# Patient Record
Sex: MALE | Race: WHITE | NOT HISPANIC OR LATINO
[De-identification: names, ages, dates, MRNs, and addresses within clinical notes are randomized per-mention and may not be internally consistent; named-entity substitution may affect disease eponyms.]

---

## 2022-05-20 ENCOUNTER — APPOINTMENT (OUTPATIENT)
Dept: ORTHOPEDIC SURGERY | Facility: CLINIC | Age: 77
End: 2022-05-20

## 2022-05-25 PROBLEM — Z00.00 ENCOUNTER FOR PREVENTIVE HEALTH EXAMINATION: Status: ACTIVE | Noted: 2022-05-25

## 2022-06-10 ENCOUNTER — APPOINTMENT (OUTPATIENT)
Dept: ORTHOPEDIC SURGERY | Facility: CLINIC | Age: 77
End: 2022-06-10
Payer: MEDICARE

## 2022-06-10 VITALS — WEIGHT: 213 LBS | HEIGHT: 73 IN | BODY MASS INDEX: 28.23 KG/M2

## 2022-06-10 PROCEDURE — J3490M: CUSTOM

## 2022-06-10 PROCEDURE — 73562 X-RAY EXAM OF KNEE 3: CPT | Mod: LT

## 2022-06-10 PROCEDURE — 20610 DRAIN/INJ JOINT/BURSA W/O US: CPT

## 2022-06-10 PROCEDURE — 99214 OFFICE O/P EST MOD 30 MIN: CPT | Mod: 25

## 2022-06-10 NOTE — PROCEDURE
[Large Joint Injection] : Large joint injection [Left] : of the left [Knee] : knee [Pain] : pain [Inflammation] : inflammation [Alcohol] : alcohol [Betadine] : betadine [Ethyl Chloride sprayed topically] : ethyl chloride sprayed topically [Sterile technique used] : sterile technique used [___ cc    6mg] :  Betamethasone (Celestone) ~Vcc of 6mg [___ cc    1%] : Lidocaine ~Vcc of 1%  [Effusion] : effusion [] : Patient tolerated procedure well [Call if redness, pain or fever occur] : call if redness, pain or fever occur [Apply ice for 15min out of every hour for the next 12-24 hours as tolerated] : apply ice for 15 minutes out of every hour for the next 12-24 hours as tolerated [Patient was advised to rest the joint(s) for ____ days] : patient was advised to rest the joint(s) for [unfilled] days [Previous OTC use and PT nontherapeutic] : patient has tried OTC's including aspirin, Ibuprofen, Aleve, etc or prescription NSAIDS, and/or exercises at home and/or physical therapy without satisfactory response [Patient had decreased mobility in the joint] : patient had decreased mobility in the joint [Risks, benefits, alternatives discussed / Verbal consent obtained] : the risks benefits, and alternatives have been discussed, and verbal consent was obtained [de-identified] : 20cc [de-identified] : clear yellow

## 2022-06-10 NOTE — PHYSICAL EXAM
[Left] : left knee [5___] : hamstring 5[unfilled]/5 [Equivocal] : equivocal Dahlia [] : negative Lachmann [TWNoteComboBox7] : flexion 125 degrees [de-identified] : extension 5 degrees

## 2022-06-10 NOTE — IMAGING
[Left] : left knee [All Views] : anteroposterior, lateral, skyline, and anteroposterior standing [advanced tricompartmental OA with medial compartment narrowing and varus alignment] : advanced tricompartmental OA with medial compartment narrowing and varus alignment

## 2022-06-10 NOTE — HISTORY OF PRESENT ILLNESS
[Dull/Aching] : dull/aching [Intermittent] : intermittent [de-identified] : 6-10-22- He is known to have b/l knee oa does well with episodic asp and csi last done in 2020. Over the last few months pain and swelling has returned [] : no [FreeTextEntry1] : Left Knee [FreeTextEntry5] : Pt stated that he having pain in the left knee and it here for a cortisone shot. [de-identified] : 11/2020

## 2022-08-01 ENCOUNTER — APPOINTMENT (OUTPATIENT)
Dept: ORTHOPEDIC SURGERY | Facility: CLINIC | Age: 77
End: 2022-08-01

## 2022-08-01 VITALS — WEIGHT: 213 LBS | BODY MASS INDEX: 28.23 KG/M2 | HEIGHT: 73 IN

## 2022-08-01 PROCEDURE — 20610 DRAIN/INJ JOINT/BURSA W/O US: CPT | Mod: 50

## 2022-08-01 PROCEDURE — 99213 OFFICE O/P EST LOW 20 MIN: CPT | Mod: 25

## 2022-08-01 NOTE — PROCEDURE
[Large Joint Injection] : Large joint injection [Left] : of the left [Knee] : knee [Pain] : pain [Inflammation] : inflammation [Alcohol] : alcohol [Betadine] : betadine [Ethyl Chloride sprayed topically] : ethyl chloride sprayed topically [Sterile technique used] : sterile technique used [Orthovisc] : Orthovisc [#1] : series #1 [Effusion] : effusion [] : Patient tolerated procedure well [Call if redness, pain or fever occur] : call if redness, pain or fever occur [Apply ice for 15min out of every hour for the next 12-24 hours as tolerated] : apply ice for 15 minutes out of every hour for the next 12-24 hours as tolerated [Patient was advised to rest the joint(s) for ____ days] : patient was advised to rest the joint(s) for [unfilled] days [Previous OTC use and PT nontherapeutic] : patient has tried OTC's including aspirin, Ibuprofen, Aleve, etc or prescription NSAIDS, and/or exercises at home and/or physical therapy without satisfactory response [Patient had decreased mobility in the joint] : patient had decreased mobility in the joint [Risks, benefits, alternatives discussed / Verbal consent obtained] : the risks benefits, and alternatives have been discussed, and verbal consent was obtained [de-identified] : 20cc [de-identified] : clear yellow

## 2022-08-01 NOTE — HISTORY OF PRESENT ILLNESS
[Dull/Aching] : dull/aching [Intermittent] : intermittent [de-identified] : 6-10-22- He is known to have b/l knee oa does well with episodic asp and csi last done in 2020. Over the last few months pain and swelling has returned [] : no [FreeTextEntry1] : Left Knee [FreeTextEntry5] : Pt stated that he having pain in the left knee and it here for a cortisone shot. [de-identified] : 11/2020

## 2022-08-01 NOTE — PHYSICAL EXAM
[Left] : left knee [5___] : hamstring 5[unfilled]/5 [Equivocal] : equivocal Dahlia [] : negative Lachmann [TWNoteComboBox7] : flexion 125 degrees [de-identified] : extension 5 degrees

## 2022-08-09 ENCOUNTER — APPOINTMENT (OUTPATIENT)
Dept: ORTHOPEDIC SURGERY | Facility: CLINIC | Age: 77
End: 2022-08-09

## 2022-08-09 PROCEDURE — 20610 DRAIN/INJ JOINT/BURSA W/O US: CPT | Mod: LT

## 2022-08-09 NOTE — PROCEDURE
[Large Joint Injection] : Large joint injection [Left] : of the left [Knee] : knee [Pain] : pain [Inflammation] : inflammation [Alcohol] : alcohol [Betadine] : betadine [Ethyl Chloride sprayed topically] : ethyl chloride sprayed topically [Sterile technique used] : sterile technique used [Orthovisc] : Orthovisc [#2] : series #2 [Effusion] : effusion [] : Patient tolerated procedure well [Call if redness, pain or fever occur] : call if redness, pain or fever occur [Apply ice for 15min out of every hour for the next 12-24 hours as tolerated] : apply ice for 15 minutes out of every hour for the next 12-24 hours as tolerated [Patient was advised to rest the joint(s) for ____ days] : patient was advised to rest the joint(s) for [unfilled] days [Previous OTC use and PT nontherapeutic] : patient has tried OTC's including aspirin, Ibuprofen, Aleve, etc or prescription NSAIDS, and/or exercises at home and/or physical therapy without satisfactory response [Patient had decreased mobility in the joint] : patient had decreased mobility in the joint [Risks, benefits, alternatives discussed / Verbal consent obtained] : the risks benefits, and alternatives have been discussed, and verbal consent was obtained

## 2022-08-09 NOTE — PHYSICAL EXAM
[Left] : left knee [5___] : hamstring 5[unfilled]/5 [Equivocal] : equivocal Dahlia [] : negative Lachmann [TWNoteComboBox7] : flexion 125 degrees [de-identified] : extension 5 degrees

## 2022-08-09 NOTE — HISTORY OF PRESENT ILLNESS
[Dull/Aching] : dull/aching [Intermittent] : intermittent [de-identified] : 8-9-22- for continued orthovisc #2 to the left knee\par \par 6-10-22- He is known to have b/l knee oa does well with episodic asp and csi last done in 2020. Over the last few months pain and swelling has returned [] : no [FreeTextEntry1] : Left Knee [FreeTextEntry5] : Pt stated that he having pain in the left knee and it here for a cortisone shot. [de-identified] : 11/2020

## 2022-08-15 ENCOUNTER — APPOINTMENT (OUTPATIENT)
Dept: ORTHOPEDIC SURGERY | Facility: CLINIC | Age: 77
End: 2022-08-15

## 2022-08-15 PROCEDURE — 20610 DRAIN/INJ JOINT/BURSA W/O US: CPT | Mod: LT

## 2022-08-15 NOTE — HISTORY OF PRESENT ILLNESS
[Dull/Aching] : dull/aching [Intermittent] : intermittent [de-identified] : 8-15-22- for continued orthovisc #3 to the left knee\par \par 6-10-22- He is known to have b/l knee oa does well with episodic asp and csi last done in 2020. Over the last few months pain and swelling has returned [] : no [FreeTextEntry1] : Left Knee [FreeTextEntry5] : Pt stated that he having pain in the left knee and it here for a cortisone shot. [de-identified] : 11/2020

## 2022-08-15 NOTE — PHYSICAL EXAM
[Left] : left knee [5___] : hamstring 5[unfilled]/5 [Equivocal] : equivocal Dahlia [] : negative Lachmann [TWNoteComboBox7] : flexion 125 degrees [de-identified] : extension 5 degrees

## 2022-08-22 ENCOUNTER — APPOINTMENT (OUTPATIENT)
Dept: ORTHOPEDIC SURGERY | Facility: CLINIC | Age: 77
End: 2022-08-22

## 2022-08-22 PROCEDURE — 20610 DRAIN/INJ JOINT/BURSA W/O US: CPT | Mod: LT

## 2022-08-22 NOTE — HISTORY OF PRESENT ILLNESS
[Dull/Aching] : dull/aching [Intermittent] : intermittent [de-identified] : 8-22-22- for continued orthovisc #4 to the left knee\par \par 6-10-22- He is known to have b/l knee oa does well with episodic asp and csi last done in 2020. Over the last few months pain and swelling has returned [] : no [FreeTextEntry1] : Left Knee [FreeTextEntry5] : Pt stated that he having pain in the left knee and it here for a cortisone shot. [de-identified] : 11/2020

## 2022-08-22 NOTE — PHYSICAL EXAM
[Left] : left knee [5___] : hamstring 5[unfilled]/5 [Equivocal] : equivocal Dahlia [] : negative Lachmann [TWNoteComboBox7] : flexion 125 degrees [de-identified] : extension 5 degrees

## 2022-09-12 ENCOUNTER — APPOINTMENT (OUTPATIENT)
Dept: ORTHOPEDIC SURGERY | Facility: CLINIC | Age: 77
End: 2022-09-12

## 2022-09-12 VITALS — WEIGHT: 213 LBS | BODY MASS INDEX: 28.23 KG/M2 | HEIGHT: 73 IN

## 2022-09-12 PROCEDURE — 99214 OFFICE O/P EST MOD 30 MIN: CPT

## 2022-09-12 NOTE — DISCUSSION/SUMMARY
[de-identified] : The natural progression of Osteoarthritis was explained to the patient.  We discussed the possible treatment options from conservative to operative.  These included NSAIDS, Glucosamine and Chondrotin sulfate, and Physical Therapy as well different types of injections.  We also discussed that at some point they may progress to needed a TKA.  Information and pamphlets were given when appropriate.\par \par Entered by Jaye Reilly acting as scribe.\par

## 2022-09-12 NOTE — IMAGING
[de-identified] : left knee:\par all medial joint line tenderness\par good pulses\par 7-115/120\par crepitus\par 5/5\par NVI\par \par left hip:\par no pain with ROM\par \par XR- advanced tricompartmental OA with significant progression from 2018

## 2022-09-12 NOTE — ASSESSMENT
Problem: Asthma: Day 1 Goal: *Oxygen saturation returns to baseline Outcome: Progressing Towards Goal 
98% on Room Air, expiratory wheezing [FreeTextEntry1] : 9/12/22: Advanced L knee OA - He has failed forms of conservative treatment and is indicated for TKA. Risks and benefits discussed, all questions answered and long discussion with his wife present. They do live in [place] during the winter and will plan for TKA around the Spring time. They will call and set up televisit in the new year to discuss possible dates in mid-late April.

## 2022-09-12 NOTE — HISTORY OF PRESENT ILLNESS
[Gradual] : gradual [5] : 5 [6] : 6 [Dull/Aching] : dull/aching [Localized] : localized [Sharp] : sharp [Constant] : constant [Injection therapy] : injection therapy [Nothing helps with pain getting better] : Nothing helps with pain getting better [de-identified] : 76 y/o M with hx of advanced knee OA that has been treated by Dr. Reddy. Patient has had knee pain for multiple years that has gotten progressively worse. Has had multiple series of HA injections, with the last series providing little benefit. Has not done PT, he has been doing HEP. OTC medication as needed.  [] : no [FreeTextEntry1] : left knee [FreeTextEntry5] : pt states he has bone on bone in his left knee [de-identified] : movement  [de-identified] : 8/22/22 [de-identified] : Mona [de-identified] : x ray

## 2022-10-06 ENCOUNTER — APPOINTMENT (OUTPATIENT)
Dept: ORTHOPEDIC SURGERY | Facility: CLINIC | Age: 77
End: 2022-10-06

## 2022-10-06 PROCEDURE — 99213 OFFICE O/P EST LOW 20 MIN: CPT | Mod: 25

## 2022-10-06 PROCEDURE — 20610 DRAIN/INJ JOINT/BURSA W/O US: CPT

## 2022-10-06 PROCEDURE — J3490M: CUSTOM

## 2022-10-06 NOTE — PHYSICAL EXAM
[Left] : left knee [5___] : hamstring 5[unfilled]/5 [Equivocal] : equivocal Dahlia [] : negative Lachmann [TWNoteComboBox7] : flexion 125 degrees [de-identified] : extension 5 degrees Yes

## 2022-10-06 NOTE — PROCEDURE
[Large Joint Injection] : Large joint injection [Left] : of the left [Knee] : knee [Pain] : pain [Inflammation] : inflammation [Alcohol] : alcohol [Betadine] : betadine [Ethyl Chloride sprayed topically] : ethyl chloride sprayed topically [Sterile technique used] : sterile technique used [] : Patient tolerated procedure well [Call if redness, pain or fever occur] : call if redness, pain or fever occur [Apply ice for 15min out of every hour for the next 12-24 hours as tolerated] : apply ice for 15 minutes out of every hour for the next 12-24 hours as tolerated [Patient was advised to rest the joint(s) for ____ days] : patient was advised to rest the joint(s) for [unfilled] days [Previous OTC use and PT nontherapeutic] : patient has tried OTC's including aspirin, Ibuprofen, Aleve, etc or prescription NSAIDS, and/or exercises at home and/or physical therapy without satisfactory response [Patient had decreased mobility in the joint] : patient had decreased mobility in the joint [Risks, benefits, alternatives discussed / Verbal consent obtained] : the risks benefits, and alternatives have been discussed, and verbal consent was obtained [___ cc    6mg] :  Betamethasone (Celestone) ~Vcc of 6mg [___ cc    1%] : Lidocaine ~Vcc of 1%  [Effusion] : effusion [de-identified] : 20cc [de-identified] : clear yellow

## 2022-10-06 NOTE — HISTORY OF PRESENT ILLNESS
[Dull/Aching] : dull/aching [Intermittent] : intermittent [de-identified] : 10-6-22- completed ha injection left knee in august. saw Dr Garrett in sept and is anticipating tka in the spring.\par \par \par 6-10-22- He is known to have b/l knee oa does well with episodic asp and csi last done in 2020. Over the last few months pain and swelling has returned [] : no [FreeTextEntry1] : Left Knee [FreeTextEntry5] : Pt stated that he having pain in the left knee and it here for a cortisone shot. [de-identified] : 11/2020

## 2022-11-29 ENCOUNTER — APPOINTMENT (OUTPATIENT)
Dept: ORTHOPEDIC SURGERY | Facility: CLINIC | Age: 77
End: 2022-11-29

## 2022-11-29 ENCOUNTER — FORM ENCOUNTER (OUTPATIENT)
Age: 77
End: 2022-11-29

## 2022-11-29 VITALS — WEIGHT: 216 LBS | BODY MASS INDEX: 28.63 KG/M2 | HEIGHT: 73 IN

## 2022-11-29 DIAGNOSIS — E78.00 PURE HYPERCHOLESTEROLEMIA, UNSPECIFIED: ICD-10-CM

## 2022-11-29 PROCEDURE — 99213 OFFICE O/P EST LOW 20 MIN: CPT

## 2022-11-29 NOTE — IMAGING
[de-identified] : left knee:\par all medial joint line tenderness\par good pulses\par 7-115/120\par crepitus\par 5/5\par NVI\par \par left hip:\par no pain with ROM\par \par XR- advanced tricompartmental OA with significant progression from 2018

## 2022-11-29 NOTE — HISTORY OF PRESENT ILLNESS
[1] : 2 [0] : 0 [Dull/Aching] : dull/aching [de-identified] : 11/29/22: Had inj last month min pain today but scheduled for TKA 5/3/23.\par \par Previous doc:\par 76 y/o M with hx of advanced knee OA that has been treated by Dr. Reddy. Patient has had knee pain for multiple years that has gotten progressively worse. Has had multiple series of HA injections, with the last series providing little benefit. Has not done PT, he has been doing HEP. OTC medication as needed.  [] : no [FreeTextEntry1] : Left knee [FreeTextEntry5] : TAMARA LUU is a 77 year old M here for a follow up for left knee pain. Pt states his pain is much better now since getting a cortisone shot.

## 2022-11-29 NOTE — DISCUSSION/SUMMARY
[de-identified] : The natural progression of Osteoarthritis was explained to the patient.  We discussed the possible treatment options from conservative to operative.  These included NSAIDS, Glucosamine and Chondrotin sulfate, and Physical Therapy as well different types of injections.  We also discussed that at some point they may progress to needed a TKA.  Information and pamphlets were given when appropriate.\par \par Patient Complains of pain in Knee with a level that often reaches greater than a 8/10. The Pain has been progressively worsening of his/her treatment coarse. The pain has interfered with their ADLs and worsens with weight bearing. On exam they often have episodes of swelling/effusion with limited ROM. Pain worsens with ROM passive and active and I can palpate crepitus.\par  X rays were reviewed with the patient and they show joint space narrowing, subchondral sclerosis, osteophyte formation, and subchondral cysts.\par  After a period of more than 12 weeks physical therapy or exercise program done with me or another treating physician they have continued pain. The patient has failed a trial of NSAID medication or pain relieves if they were unable to tolerate NSAID medications as well as a series of injection, steroid or Hyaluronic Acid. After a long discussion with the patient both the patient and I have decided we have exhausted all forms of less radical treatments and they would like to proceed with Total Knee Replacement\par \par We discussed my findings and the natural history of their condition.  We talked about the details of the proposed surgery and the recovery.  We discussed the material risks, possible benefits and alternatives to surgery.  The risks include but are not limited to infection, bleeding and possible need for blood transfusion, fracture, bowel blockage, bladder retention or infection, need for reoperation, stiffness and/or limited range of motion, possible damage to nerves and blood vessels, failure of fixation of components, risk of deep vein thromboses and pulmonary embolism, wound healing problems, dislocation, and possible leg length discrepancy.  Although incredibly rare, we also discussed the risks of a cardiac event, stroke and even death during, or following, the surgery.  We discussed the type of implants the patient will be receiving and the type of fixation that will be used, as well as whether a robot or computer navigation aide will be used.  The patient understands they will need medical clearance and will attend a preoperative joint education class.  We also discussed the type of anesthesia they will receive, and the risks associated with hospital or rehab length of stay, obesity, diabetes and smoking.\par

## 2022-11-29 NOTE — ASSESSMENT
[FreeTextEntry1] : Previous doc:\par 9/12/22: Advanced L knee OA - He has failed forms of conservative treatment and is indicated for TKA. Risks and benefits discussed, all questions answered and long discussion with his wife present. They do live in [place] during the winter and will plan for TKA around the Spring time. They will call and set up televisit in the new year to discuss possible dates in mid-late April.  \par \par 11/29/22: Planning for TKA in May - discussed risks and benefits.  Need CT left knee eval bone loss and he will do this before he leaves for [place] in Feb.

## 2022-11-30 ENCOUNTER — APPOINTMENT (OUTPATIENT)
Dept: CT IMAGING | Facility: CLINIC | Age: 77
End: 2022-11-30

## 2022-11-30 PROCEDURE — 76376 3D RENDER W/INTRP POSTPROCES: CPT | Mod: LT

## 2022-11-30 PROCEDURE — 73700 CT LOWER EXTREMITY W/O DYE: CPT | Mod: LT,MH

## 2023-01-20 ENCOUNTER — APPOINTMENT (OUTPATIENT)
Dept: ORTHOPEDIC SURGERY | Facility: CLINIC | Age: 78
End: 2023-01-20
Payer: MEDICARE

## 2023-01-20 PROCEDURE — J3490N: CUSTOM | Mod: NC

## 2023-01-20 PROCEDURE — 99213 OFFICE O/P EST LOW 20 MIN: CPT | Mod: 25

## 2023-01-20 PROCEDURE — 20610 DRAIN/INJ JOINT/BURSA W/O US: CPT | Mod: LT

## 2023-01-20 NOTE — HISTORY OF PRESENT ILLNESS
[Dull/Aching] : dull/aching [Intermittent] : intermittent [de-identified] : 1-20-23- Known b/l knee oa. last had csi in october. scheduled for left total knee with Tami in may.\par \par 10-6-22- completed ha injection left knee in august. saw Dr Garrett in sept and is anticipating tka in the spring.\par \par \par 6-10-22- He is known to have b/l knee oa does well with episodic asp and csi last done in 2020. Over the last few months pain and swelling has returned [] : no [FreeTextEntry1] : Left Knee [FreeTextEntry5] : Pt stated that he having pain in the left knee and it here for a cortisone shot. [de-identified] : 11/2020

## 2023-01-20 NOTE — PROCEDURE
[Large Joint Injection] : Large joint injection [Left] : of the left [Knee] : knee [Pain] : pain [Inflammation] : inflammation [Alcohol] : alcohol [Betadine] : betadine [Ethyl Chloride sprayed topically] : ethyl chloride sprayed topically [Sterile technique used] : sterile technique used [___ cc    6mg] :  Betamethasone (Celestone) ~Vcc of 6mg [___ cc    1%] : Lidocaine ~Vcc of 1%  [] : Patient tolerated procedure well [Call if redness, pain or fever occur] : call if redness, pain or fever occur [Apply ice for 15min out of every hour for the next 12-24 hours as tolerated] : apply ice for 15 minutes out of every hour for the next 12-24 hours as tolerated [Patient was advised to rest the joint(s) for ____ days] : patient was advised to rest the joint(s) for [unfilled] days [Previous OTC use and PT nontherapeutic] : patient has tried OTC's including aspirin, Ibuprofen, Aleve, etc or prescription NSAIDS, and/or exercises at home and/or physical therapy without satisfactory response [Patient had decreased mobility in the joint] : patient had decreased mobility in the joint [Risks, benefits, alternatives discussed / Verbal consent obtained] : the risks benefits, and alternatives have been discussed, and verbal consent was obtained

## 2023-01-20 NOTE — PHYSICAL EXAM
[Left] : left knee [5___] : hamstring 5[unfilled]/5 [Equivocal] : equivocal Dahlia [] : negative Lachmann [TWNoteComboBox7] : flexion 125 degrees [de-identified] : extension 5 degrees

## 2023-04-13 ENCOUNTER — OUTPATIENT (OUTPATIENT)
Dept: OUTPATIENT SERVICES | Facility: HOSPITAL | Age: 78
LOS: 1 days | Discharge: HOME | End: 2023-04-13
Payer: MEDICARE

## 2023-04-13 VITALS
SYSTOLIC BLOOD PRESSURE: 152 MMHG | WEIGHT: 222.67 LBS | RESPIRATION RATE: 18 BRPM | OXYGEN SATURATION: 95 % | DIASTOLIC BLOOD PRESSURE: 71 MMHG | HEIGHT: 73 IN | TEMPERATURE: 98 F | HEART RATE: 56 BPM

## 2023-04-13 DIAGNOSIS — E78.5 HYPERLIPIDEMIA, UNSPECIFIED: ICD-10-CM

## 2023-04-13 DIAGNOSIS — M17.12 UNILATERAL PRIMARY OSTEOARTHRITIS, LEFT KNEE: ICD-10-CM

## 2023-04-13 DIAGNOSIS — S76.111A STRAIN OF RIGHT QUADRICEPS MUSCLE, FASCIA AND TENDON, INITIAL ENCOUNTER: Chronic | ICD-10-CM

## 2023-04-13 DIAGNOSIS — Z01.818 ENCOUNTER FOR OTHER PREPROCEDURAL EXAMINATION: ICD-10-CM

## 2023-04-13 LAB
ALBUMIN SERPL ELPH-MCNC: 3.8 G/DL — SIGNIFICANT CHANGE UP (ref 3.3–5)
ALP SERPL-CCNC: 53 U/L — SIGNIFICANT CHANGE UP (ref 40–120)
ALT FLD-CCNC: 25 U/L — SIGNIFICANT CHANGE UP (ref 12–78)
ANION GAP SERPL CALC-SCNC: 3 MMOL/L — LOW (ref 5–17)
APTT BLD: 29.4 SEC — SIGNIFICANT CHANGE UP (ref 27.5–35.5)
AST SERPL-CCNC: 21 U/L — SIGNIFICANT CHANGE UP (ref 15–37)
BASOPHILS # BLD AUTO: 0.02 K/UL — SIGNIFICANT CHANGE UP (ref 0–0.2)
BASOPHILS NFR BLD AUTO: 0.3 % — SIGNIFICANT CHANGE UP (ref 0–2)
BILIRUB SERPL-MCNC: 0.8 MG/DL — SIGNIFICANT CHANGE UP (ref 0.2–1.2)
BLD GP AB SCN SERPL QL: SIGNIFICANT CHANGE UP
BUN SERPL-MCNC: 19 MG/DL — SIGNIFICANT CHANGE UP (ref 7–23)
CALCIUM SERPL-MCNC: 9.5 MG/DL — SIGNIFICANT CHANGE UP (ref 8.5–10.1)
CHLORIDE SERPL-SCNC: 110 MMOL/L — HIGH (ref 96–108)
CO2 SERPL-SCNC: 26 MMOL/L — SIGNIFICANT CHANGE UP (ref 22–31)
CREAT SERPL-MCNC: 1.13 MG/DL — SIGNIFICANT CHANGE UP (ref 0.5–1.3)
EGFR: 67 ML/MIN/1.73M2 — SIGNIFICANT CHANGE UP
EOSINOPHIL # BLD AUTO: 0.11 K/UL — SIGNIFICANT CHANGE UP (ref 0–0.5)
EOSINOPHIL NFR BLD AUTO: 1.7 % — SIGNIFICANT CHANGE UP (ref 0–6)
GLUCOSE SERPL-MCNC: 93 MG/DL — SIGNIFICANT CHANGE UP (ref 70–99)
HCT VFR BLD CALC: 44.4 % — SIGNIFICANT CHANGE UP (ref 39–50)
HGB BLD-MCNC: 15 G/DL — SIGNIFICANT CHANGE UP (ref 13–17)
IMM GRANULOCYTES NFR BLD AUTO: 0.3 % — SIGNIFICANT CHANGE UP (ref 0–0.9)
INR BLD: 1.12 RATIO — SIGNIFICANT CHANGE UP (ref 0.88–1.16)
LYMPHOCYTES # BLD AUTO: 1.2 K/UL — SIGNIFICANT CHANGE UP (ref 1–3.3)
LYMPHOCYTES # BLD AUTO: 18.9 % — SIGNIFICANT CHANGE UP (ref 13–44)
MCHC RBC-ENTMCNC: 30.9 PG — SIGNIFICANT CHANGE UP (ref 27–34)
MCHC RBC-ENTMCNC: 33.8 G/DL — SIGNIFICANT CHANGE UP (ref 32–36)
MCV RBC AUTO: 91.5 FL — SIGNIFICANT CHANGE UP (ref 80–100)
MONOCYTES # BLD AUTO: 0.76 K/UL — SIGNIFICANT CHANGE UP (ref 0–0.9)
MONOCYTES NFR BLD AUTO: 12 % — SIGNIFICANT CHANGE UP (ref 2–14)
NEUTROPHILS # BLD AUTO: 4.23 K/UL — SIGNIFICANT CHANGE UP (ref 1.8–7.4)
NEUTROPHILS NFR BLD AUTO: 66.8 % — SIGNIFICANT CHANGE UP (ref 43–77)
NRBC # BLD: 0 /100 WBCS — SIGNIFICANT CHANGE UP (ref 0–0)
PLATELET # BLD AUTO: 266 K/UL — SIGNIFICANT CHANGE UP (ref 150–400)
POTASSIUM SERPL-MCNC: 4.1 MMOL/L — SIGNIFICANT CHANGE UP (ref 3.5–5.3)
POTASSIUM SERPL-SCNC: 4.1 MMOL/L — SIGNIFICANT CHANGE UP (ref 3.5–5.3)
PROT SERPL-MCNC: 7.2 GM/DL — SIGNIFICANT CHANGE UP (ref 6–8.3)
PROTHROM AB SERPL-ACNC: 13.4 SEC — SIGNIFICANT CHANGE UP (ref 10.5–13.4)
RBC # BLD: 4.85 M/UL — SIGNIFICANT CHANGE UP (ref 4.2–5.8)
RBC # FLD: 12.9 % — SIGNIFICANT CHANGE UP (ref 10.3–14.5)
SODIUM SERPL-SCNC: 139 MMOL/L — SIGNIFICANT CHANGE UP (ref 135–145)
WBC # BLD: 6.34 K/UL — SIGNIFICANT CHANGE UP (ref 3.8–10.5)
WBC # FLD AUTO: 6.34 K/UL — SIGNIFICANT CHANGE UP (ref 3.8–10.5)

## 2023-04-13 PROCEDURE — 93010 ELECTROCARDIOGRAM REPORT: CPT

## 2023-04-13 NOTE — H&P PST ADULT - ASSESSMENT
78 y/o M [facility] HLD presents to PST for scheduled left total knee arthroplasty on 5/3/23 with Dr.Germano CAPRINI SCORE [CLOT]    AGE RELATED RISK FACTORS                                                       MOBILITY RELATED FACTORS  [ ] Age 41-60 years                                            (1 Point)                  [ ] Bed rest                                                        (1 Point)  [ ] Age: 61-74 years                                           (2 Points)                 [ ] Plaster cast                                                   (2 Points)  [x ] Age= 75 years                                              (3 Points)                 [ ] Bed bound for more than 72 hours                 (2 Points)    DISEASE RELATED RISK FACTORS                                               GENDER SPECIFIC FACTORS  [ ] Edema in the lower extremities                       (1 Point)                  [ ] Pregnancy                                                     (1 Point)  [ ] Varicose veins                                               (1 Point)                  [ ] Post-partum < 6 weeks                                   (1 Point)             [ x] BMI > 25 Kg/m2                                            (1 Point)                  [ ] Hormonal therapy  or oral contraception          (1 Point)                 [ ] Sepsis (in the previous month)                        (1 Point)                  [ ] History of pregnancy complications                 (1 point)  [ ] Pneumonia or serious lung disease                                               [ ] Unexplained or recurrent                     (1 Point)           (in the previous month)                               (1 Point)  [ ] Abnormal pulmonary function test                     (1 Point)                 SURGERY RELATED RISK FACTORS  [ ] Acute myocardial infarction                              (1 Point)                 [ ]  Section                                             (1 Point)  [ ] Congestive heart failure (in the previous month)  (1 Point)               [ ] Minor surgery                                                  (1 Point)   [ ] Inflammatory bowel disease                             (1 Point)                 [ ] Arthroscopic surgery                                        (2 Points)  [ ] Central venous access                                      (2 Points)                [ ] General surgery lasting more than 45 minutes   (2 Points)       [ ] Stroke (in the previous month)                          (5 Points)               [ x] Elective arthroplasty                                         (5 Points)                                                                                                                                               HEMATOLOGY RELATED FACTORS                                                 TRAUMA RELATED RISK FACTORS  [ ] Prior episodes of VTE                                     (3 Points)                [ ] Fracture of the hip, pelvis, or leg                       (5 Points)  [ ] Positive family history for VTE                         (3 Points)                 [ ] Acute spinal cord injury (in the previous month)  (5 Points)  [ ] Prothrombin 43546 A                                     (3 Points)                 [ ] Paralysis  (less than 1 month)                             (5 Points)  [ ] Factor V Leiden                                             (3 Points)                  [ ] Multiple Trauma within 1 month                        (5 Points)  [ ] Lupus anticoagulants                                     (3 Points)                                                           [ ] Anticardiolipin antibodies                               (3 Points)                                                       [ ] High homocysteine in the blood                      (3 Points)                                             [ ] Other congenital or acquired thrombophilia      (3 Points)                                                [ ] Heparin induced thrombocytopenia                  (3 Points)                                          Total Score [     9     ]    Mariei Score 0 - 2:  Low Risk, No VTE Prophylaxis required for most patients, encourage ambulation  Caprini Score 3 - 6:  At Risk, pharmacologic VTE prophylaxis is indicated for most patients (in the absence of a contraindication)  Caprini Score Greater than or = 7:  High Risk, pharmacologic VTE prophylaxis is indicated for most patients (in the absence of a contraindication)

## 2023-04-13 NOTE — OCCUPATIONAL THERAPY INITIAL EVALUATION ADULT - GENERAL OBSERVATIONS, REHAB EVAL
Chart reviewed. Patient encountered seated in chair in rehab preop room in [facility]. Patient underwent occupational therapy pre-operative consultation to determine current functional ADL limitations in order to provide the right equipment for patient to perform functional ADL post operation.

## 2023-04-13 NOTE — OCCUPATIONAL THERAPY INITIAL EVALUATION ADULT - LIVES WITH, PROFILE
in a fist floor condo with no steps to enter. Once inside, pt has to negotiate a flight of stairs with 13 steps right descending handrail to access the TV and laundry room. Most living amenities are located on one level. The bathroom has a walk in shower, grab bars, fixed/ retractable shower head and standard toilet./spouse in a first floor condo with no steps to enter. Once inside, pt has to negotiate a flight of stairs with 13 steps, right descending handrail to access the TV and laundry room. Most living amenities are located on one level. The bathroom has a walk in shower, grab bars, fixed/ retractable shower head and standard toilet with adequate space to fit a commode over it./spouse

## 2023-04-13 NOTE — OCCUPATIONAL THERAPY INITIAL EVALUATION ADULT - PERTINENT HX OF CURRENT PROBLEM, REHAB EVAL
Pt is a 78 y/o male slated for elective surgery for left TKR  with MD Ventura on 5/3/2023 due to OA, chronic pain and DJD. Pt reported buckling in his left knee, but denied any falls in the past 3-6 months.

## 2023-04-13 NOTE — H&P PST ADULT - IS PATIENT PREGNANT?
History and Physical    Today's Date:  2019   Patient's Name: Vincent Palma   Patient's :  1995     History:     Chief Complaint   Patient presents with    Weight Management    Establish Care    Irregular Menses     uses birth control     Vincent Palma is a 26 y.o. female presenting for initial visit to establish care. Will obtain records from previous provider to review. Care team updated on connect care. Irregular cycles  This is a chronic problem, new to me. This is not controlled. Pt was on birth control. Pt has not had a pap. Obesity Class III  This is a chronic problem, new to me. This is not at goal. Pt does not exercise regularly. Pt tries to eat a healthy diet. 3 most recent PHQ Screens 2019   Little interest or pleasure in doing things Not at all   Feeling down, depressed, irritable, or hopeless Not at all   Total Score PHQ 2 0      Past Medical History:   Diagnosis Date    Irregular periods/menstrual cycles 2019     History reviewed. No pertinent surgical history. reports that she has been smoking. She has smoked for the past 1.00 year. She uses smokeless tobacco. She reports that she drinks alcohol. She reports that she has current or past drug history. History reviewed. No pertinent family history. Not on File    Problem List:      Patient Active Problem List   Diagnosis Code    [facility] ([facility] E66.01    Irregular periods/menstrual cycles N92.6     Medications:     No current outpatient medications on file. No current facility-administered medications for this visit.       Review of Systems:   General:   fevers, chills  Neurologic: dizziness, lightheadedness  Eyes:  vision changes, double vision, photophobia  Ears:  change in hearing, ear pain, ear discharge, ear ringing  Nose:  sneezing, runny nose  Mouth/Throat: sore throat, voice change  Neck:  pain, stiffness  Respiratory: dyspnea at rest, dyspnea on exertion, wheezing, cough, sputum production  Cardiovascular:   chest pain, palpitations  Breasts: lumps, discharge, pain, rash  Gastrointestinal:  nausea, vomiting  Urinary: dysuria, urinary frequency, nocturia, malodorous urine  Genital (F): vaginal discharge, ulcerations  Musculoskeletal:  joint pain, back pain  Psychiatric: insomnia, anxiety  Endocrine: cold intolerance, +heat intolerance  Hematologic: easy bruising, easy bleeding  Dermatologic: Itching, rash    Physical Assessment:   Visit Vitals  /88 (BP 1 Location: Left arm, BP Patient Position: Sitting)   Pulse 96   Temp 98.1 °F (36.7 °C) (Oral)   Resp 16   Ht 5' 1\" (1.549 m)   Wt 276 lb 12.8 oz (125.6 kg)   LMP 06/12/2015   SpO2 98%   BMI 52.30 kg/m²     General:   Well-groomed, well-nourished, in no distress, pleasant, appropriate and conversant. Eyes:    PERRL, conjunctiva clear  Ears:  TMs normal, no ear wax  Mouth:  oropharynx WNL without membranes, exudates, petechiae or ulcers  Cardiovascular:   RRR, no MRG. Pulmonary:   Lungs clear bilaterally. Normal respiratory effort. Abdomen:   Abdomen soft, NT, ND  Extremities:   No edema, LEs warm and well-perfused. Neuro:   Alert and oriented, no focal deficits. No facial asymmetry noted. Skin:    No rash or jaundice  MSK:   Normal ROM, 5/5 muscle strength  Psych:  No pressured speech or abnormal thought content    Assessment/Plan & Orders:         ICD-10-CM ICD-9-CM    1. Irregular periods/menstrual cycles N92.6 626.4    2. Obesity, morbid ([facility] E66.01 278.01 CBC WITH AUTOMATED DIFF      LIPID PANEL      METABOLIC PANEL, COMPREHENSIVE      TSH 3RD GENERATION      T4, FREE      HEMOGLOBIN A1C WITH EAG   3. Screening for depression Z13.31 V79.0 RI DEPRESSION SCREEN ANNUAL      RI PATIENT SCREENED FOR DEPRESSION   4.  Encounter for immunization Z23 V03.89 diph,Pertuss,Acell,,Tet Vac-PF (ADACEL) 2 Lf-(2.5-5-3-5 mcg)-5Lf/0.5 mL susp     HM  Colon cancer: colonoscopy due at age 49  Dyslipidemia: will check FLP  Diabetes mellitus: will check A1c  Influenza vaccine: due in the fall  Pneumococcal vaccine: due, indication is smoking  Tdap: up to date  Herpes Zoster vaccine: due at age 62  Hep B vaccine: not indicated (liver dz, DM 19-59)  Weight:  Body mass index is 52.3 kg/m². Discussed the patient's BMI with her. The BMI follow up plan is as follows: Information given on ketogenic/IF diet with exercise  Cervical cancer:  pap smear due  Breast Cancer: mammogram at age 37  Osteoporosis: No indication for DEXA scan    Follow-up and Dispositions    · Return for Well woman exam, 30 minutes, Go over lab/imaging results, Weight management. *Patient verbalized understanding and agreement with the plan. Patient was given an after-visit summary. Manuelito Soria.  Fabio Powell MD - Internal Medicine  6/13/2019, 1:43 PM  [facility]  [address] [address] [place], [address]  Phone [phone]  Fax [phone] not applicable (Male)

## 2023-04-13 NOTE — OCCUPATIONAL THERAPY INITIAL EVALUATION ADULT - RANGE OF MOTION EXAMINATION, LOWER EXTREMITY
detailed exam
ROM is limited in left knee due to pain/Left LE Active ROM was WFL (within functional limits)/Left LE Passive ROM was WFL (w/i functional limits)/Right LE Active ROM was WNL(within normal limits)/Right LE Passive ROM was WNL (within normal limits)

## 2023-04-13 NOTE — OCCUPATIONAL THERAPY INITIAL EVALUATION ADULT - ADDITIONAL COMMENTS
At this time, pt is functioning in his roles, self sufficient, driving & ambulating independently in the community without any assistive devices. Pt owns a rolling walker, 3:1 commode and axillary crutches. Pt c/o 5/10 pain in his left knee at rest and 7/10 at worse. The pain is exacerbated, by walking, prolonged standing, negotiating steps and is relieved with rest. Pt is right hand dominant and wears glasses for reading.

## 2023-04-13 NOTE — H&P PST ADULT - HISTORY OF PRESENT ILLNESS
78 y/o M PMH HLD presents to PST for scheduled left total knee arthroplasty on 5/3/23 with      This patient denies any fever, cough, sob, flu like symptoms or travel outside of the [place] in the past 30 days     goal: to walk without pain

## 2023-04-14 LAB
A1C WITH ESTIMATED AVERAGE GLUCOSE RESULT: 5.6 % — SIGNIFICANT CHANGE UP (ref 4–5.6)
ESTIMATED AVERAGE GLUCOSE: 114 MG/DL — SIGNIFICANT CHANGE UP (ref 68–114)
MRSA PCR RESULT.: SIGNIFICANT CHANGE UP
S AUREUS DNA NOSE QL NAA+PROBE: SIGNIFICANT CHANGE UP
VIT D25+D1,25 OH+D1,25 PNL SERPL-MCNC: 45 PG/ML — SIGNIFICANT CHANGE UP (ref 19.9–79.3)

## 2023-04-21 ENCOUNTER — APPOINTMENT (OUTPATIENT)
Dept: ORTHOPEDIC SURGERY | Facility: CLINIC | Age: 78
End: 2023-04-21
Payer: MEDICARE

## 2023-04-21 PROCEDURE — 99213 OFFICE O/P EST LOW 20 MIN: CPT

## 2023-04-21 NOTE — ASSESSMENT
[FreeTextEntry1] : Previous doc:\par 9/12/22: Advanced L knee OA - He has failed forms of conservative treatment and is indicated for TKA. Risks and benefits discussed, all questions answered and long discussion with his wife present. They do live in [place] during the winter and will plan for TKA around the Spring time. They will call and set up televisit in the new year to discuss possible dates in mid-late April.  \par 11/29/22: Planning for TKA in May - discussed risks and benefits.  Need CT left knee eval bone loss and he will do this before he leaves for [place] in Feb.\par \par 4/21/23: Cont pain - will proceed with TKA as scheduled.  No significnat medical hx.

## 2023-04-21 NOTE — IMAGING
[de-identified] : left knee:\par all medial joint line tenderness\par good pulses\par 7-115/120\par crepitus\par 5/5\par NVI\par \par left hip:\par no pain with ROM

## 2023-04-21 NOTE — HISTORY OF PRESENT ILLNESS
[7] : 7 [5] : 5 [Dull/Aching] : dull/aching [de-identified] : 4/21/23: Cont pain, planning for left TKA in May.\par \par Previous doc:\par 78 y/o M with hx of advanced knee OA that has been treated by Dr. Reddy. Patient has had knee pain for multiple years that has gotten progressively worse. Has had multiple series of HA injections, with the last series providing little benefit. Has not done PT, he has been doing HEP. OTC medication as needed. \par 11/29/22: Had inj last month min pain today but scheduled for TKA 5/3/23.

## 2023-04-21 NOTE — DISCUSSION/SUMMARY
[de-identified] : The natural progression of Osteoarthritis was explained to the patient.  We discussed the possible treatment options from conservative to operative.  These included NSAIDS, Glucosamine and Chondrotin sulfate, and Physical Therapy as well different types of injections.  We also discussed that at some point they may progress to needed a TKA.  Information and pamphlets were given when appropriate.\par \par Patient Complains of pain in Knee with a level that often reaches greater than a 8/10. The Pain has been\par progressively worsening of his/her treatment coarse. The pain has interfered with their ADLs and worsens with\par weight bearing. On exam they often have episodes of swelling/effusion with limited ROM. Pain worsens with ROM\par passive and active and I can palpate crepitus.\par  X rays were reviewed with the patient and they show joint space narrowing, subchondral sclerosis,\par osteophyte formation, and subchondral cysts.\par  After a period of more than 12 weeks physical therapy or exercise program done with me or another\par treating physician they have continued pain. The patient has failed a trial of NSAID medication or pain relieves if\par they were unable to tolerate NSAID medications as well as a series of injection, steroid or Hyaluronic Acid. After a\par long discussion with the patient both the patient and I have decided we have exhausted all forms of less radical\par treatments and they would like to proceed with Total Knee Replacement\par \par We discussed my findings and the natural history of their condition.  We talked about the details of the proposed surgery and the recovery.  We discussed the material risks, possible benefits and alternatives to surgery.  The risks include but are not limited to infection, bleeding and possible need for blood transfusion, fracture, bowel blockage, bladder retention or infection, need for reoperation, stiffness and/or limited range of motion, possible damage to nerves and blood vessels, failure of fixation of components, risk of deep vein thromboses and pulmonary embolism, wound healing problems, dislocation, and possible leg length discrepancy.  Although incredibly rare, we also discussed the risks of a cardiac event, stroke and even death during, or following, the surgery.  We discussed the type of implants the patient will be receiving and the type of fixation that will be used, as well as whether a robot or computer navigation aide will be used.  The patient understands they will need medical clearance and will attend a preoperative joint education class.  We also discussed the type of anesthesia they will receive, and the risks associated with hospital or rehab length of stay, obesity, diabetes and smoking.\par

## 2023-05-02 ENCOUNTER — TRANSCRIPTION ENCOUNTER (OUTPATIENT)
Age: 78
End: 2023-05-02

## 2023-05-03 ENCOUNTER — TRANSCRIPTION ENCOUNTER (OUTPATIENT)
Age: 78
End: 2023-05-03

## 2023-05-03 ENCOUNTER — APPOINTMENT (OUTPATIENT)
Dept: ORTHOPEDIC SURGERY | Facility: HOSPITAL | Age: 78
End: 2023-05-03
Payer: MEDICARE

## 2023-05-03 ENCOUNTER — OUTPATIENT (OUTPATIENT)
Dept: OUTPATIENT SERVICES | Facility: HOSPITAL | Age: 78
LOS: 1 days | Discharge: HOME | End: 2023-05-03

## 2023-05-03 DIAGNOSIS — S76.111A STRAIN OF RIGHT QUADRICEPS MUSCLE, FASCIA AND TENDON, INITIAL ENCOUNTER: Chronic | ICD-10-CM

## 2023-05-03 PROCEDURE — 27447 TOTAL KNEE ARTHROPLASTY: CPT | Mod: LT

## 2023-05-03 PROCEDURE — 20985 CPTR-ASST DIR MS PX: CPT

## 2023-05-03 PROCEDURE — 27447 TOTAL KNEE ARTHROPLASTY: CPT | Mod: AS,LT

## 2023-05-04 ENCOUNTER — TRANSCRIPTION ENCOUNTER (OUTPATIENT)
Age: 78
End: 2023-05-04

## 2023-05-04 RX ORDER — ASPIRIN/CALCIUM CARB/MAGNESIUM 324 MG
0 TABLET ORAL
Refills: 0 | DISCHARGE

## 2023-05-04 RX ORDER — CALCIUM CARBONATE 500(1250)
0 TABLET ORAL
Refills: 0 | DISCHARGE

## 2023-05-09 ENCOUNTER — TRANSCRIPTION ENCOUNTER (OUTPATIENT)
Age: 78
End: 2023-05-09

## 2023-05-11 PROBLEM — E78.5 HYPERLIPIDEMIA, UNSPECIFIED: Chronic | Status: ACTIVE | Noted: 2023-04-13

## 2023-05-16 ENCOUNTER — APPOINTMENT (OUTPATIENT)
Dept: ORTHOPEDIC SURGERY | Facility: CLINIC | Age: 78
End: 2023-05-16
Payer: MEDICARE

## 2023-05-16 VITALS — HEIGHT: 73 IN | BODY MASS INDEX: 28.63 KG/M2 | WEIGHT: 216 LBS

## 2023-05-16 DIAGNOSIS — Z79.82 LONG TERM (CURRENT) USE OF ASPIRIN: ICD-10-CM

## 2023-05-16 DIAGNOSIS — M17.12 UNILATERAL PRIMARY OSTEOARTHRITIS, LEFT KNEE: ICD-10-CM

## 2023-05-16 DIAGNOSIS — E78.5 HYPERLIPIDEMIA, UNSPECIFIED: ICD-10-CM

## 2023-05-16 PROCEDURE — 99024 POSTOP FOLLOW-UP VISIT: CPT

## 2023-05-16 PROCEDURE — 73562 X-RAY EXAM OF KNEE 3: CPT | Mod: LT

## 2023-05-16 RX ORDER — OXYCODONE 5 MG/1
5 TABLET ORAL EVERY 6 HOURS
Qty: 30 | Refills: 0 | Status: ACTIVE | COMMUNITY
Start: 2023-05-16 | End: 1900-01-01

## 2023-05-16 NOTE — HISTORY OF PRESENT ILLNESS
[6] : 6 [Shooting] : shooting [Stabbing] : stabbing [Throbbing] : throbbing [] : Post Surgical Visit: yes [de-identified] : 5/16/23: 2 weeks s/p left TKA, a lot of pain but rarely taking oxy.  No fevers/chills.\par \par Previous doc:\par 78 y/o M with hx of advanced knee OA that has been treated by Dr. Reddy. Patient has had knee pain for multiple years that has gotten progressively worse. Has had multiple series of HA injections, with the last series providing little benefit. Has not done PT, he has been doing HEP. OTC medication as needed. \par 11/29/22: Had inj last month min pain today but scheduled for TKA 5/3/23.\par 4/21/23: Cont pain, planning for left TKA in May. [FreeTextEntry1] : Left Knee [de-identified] : tylenol, oxycodone [de-identified] : 5/3/23 [de-identified] : Lt Knee TKA

## 2023-05-16 NOTE — ASSESSMENT
[FreeTextEntry1] : Previous doc:\par 9/12/22: Advanced L knee OA - He has failed forms of conservative treatment and is indicated for TKA. Risks and benefits discussed, all questions answered and long discussion with his wife present. They do live in [place] during the winter and will plan for TKA around the Spring time. They will call and set up televisit in the new year to discuss possible dates in mid-late April.  \par 11/29/22: Planning for TKA in May - discussed risks and benefits.  Need CT left knee eval bone loss and he will do this before he leaves for [place] in Feb.\par 4/21/23: Cont pain - will proceed with TKA as scheduled.  No significnat medical hx.\par \par 5/16/23: 2 weeks postop having a lot fof pain but not taking meds - encouraged him to take meds with PT to help with increasing ROM.  Reeval in 4 weeks.

## 2023-05-16 NOTE — PHYSICAL EXAM
[Left] : left knee [AP] : anteroposterior [Lateral] : lateral [[place]] : skyline [Components well fixed, in good position] : Components well fixed, in good position [de-identified] : Left knee: Inc c/d/i.  ROM 5-80.  Lig stable.  Walker.

## 2023-05-16 NOTE — DISCUSSION/SUMMARY
[de-identified] : The patient is doing well at this time. The patient will be started on a course of physical therapy. I recommended that the patient works on range of motion at home and was shown how to do this. I encouraged the patient to increase ambulation. The patient can continue to take Tylenol for occasional discomfort. The patient was advised not to do any dental work for the first three months following the surgery. We will see the patient  back for a follow-up for a repeat evaluation. The patient  will call or return earlier for any questions or concerns.  Signs and symptoms of infection reviewed and patient advised to call immediately for redness, fevers, and/or chills.\par

## 2023-05-24 ENCOUNTER — TRANSCRIPTION ENCOUNTER (OUTPATIENT)
Age: 78
End: 2023-05-24

## 2023-06-16 ENCOUNTER — LABORATORY RESULT (OUTPATIENT)
Age: 78
End: 2023-06-16

## 2023-06-16 ENCOUNTER — APPOINTMENT (OUTPATIENT)
Dept: ORTHOPEDIC SURGERY | Facility: CLINIC | Age: 78
End: 2023-06-16
Payer: MEDICARE

## 2023-06-16 VITALS — WEIGHT: 216 LBS | BODY MASS INDEX: 28.63 KG/M2 | HEIGHT: 73 IN

## 2023-06-16 DIAGNOSIS — M25.462 EFFUSION, LEFT KNEE: ICD-10-CM

## 2023-06-16 PROCEDURE — 99024 POSTOP FOLLOW-UP VISIT: CPT

## 2023-06-16 PROCEDURE — 73562 X-RAY EXAM OF KNEE 3: CPT | Mod: LT

## 2023-06-16 NOTE — ASSESSMENT
[FreeTextEntry1] : Previous doc:\par 9/12/22: Advanced L knee OA - He has failed forms of conservative treatment and is indicated for TKA. Risks and benefits discussed, all questions answered and long discussion with his wife present. They do live in [place] during the winter and will plan for TKA around the Spring time. They will call and set up televisit in the new year to discuss possible dates in mid-late April.  \par 11/29/22: Planning for TKA in May - discussed risks and benefits.  Need CT left knee eval bone loss and he will do this before he leaves for [place] in Feb.\par 4/21/23: Cont pain - will proceed with TKA as scheduled.  No significnat medical hx.\par 5/16/23: 2 weeks postop having a lot fof pain but not taking meds - encouraged him to take meds with PT to help with increasing ROM.  Reeval in 4 weeks.\par \par 6/16/23: 6wks postop L TKA 5/3/23. Large effusion. ROM is 3-90 today; he was 7-115 preop. Aspiration today, tolerated well - 5cc clear yellow fluid. Continue with PT/HEP. Discussed ESTEPHANIA- he is well informed and would like to proceed.

## 2023-06-16 NOTE — DISCUSSION/SUMMARY
[de-identified] : The patient is doing well at this time. The patient will continue on a course of physical therapy. I recommended that the patient works on range of motion at home and was shown how to do this. I encouraged the patient to increase ambulation. The patient can continue to take Tylenol for occasional discomfort. The patient was advised not to do any dental work for the first three months following the surgery. We will see the patient  back for a follow-up for a repeat evaluation. The patient  will call or return earlier for any questions or concerns.  Signs and symptoms of infection reviewed and patient advised to call immediately for redness, fevers, and/or chills.\par \par Risks and benefits of aspiration were explained to the patient including but not limited to infection, recurrent hemarthrosis, pain at needle site, and recurrence of effusion.  The knee was prepped in the usual sterile fashion and under sterile condition injection of 3 cc of lidocaine was injected into the subcutaneous tissue.  Followed by aspiration using an 18 gauge needle. The patient was instructed to rest, ice, and place compression on the knee for at least 24 hours.   There were also instructed to call for fevers drainage, increasing pain or any other concerns. \par \par Progress note completed by Saba Padilla PA-C

## 2023-06-16 NOTE — PHYSICAL EXAM
[Left] : left knee [AP] : anteroposterior [Lateral] : lateral [[place]] : skyline [Components well fixed, in good position] : Components well fixed, in good position [de-identified] : LEFT KNEE\par Incision is clean and dry with no drainage - healing well \par Sensation intact\par Motor 5/5 \par +1 edema LE\par ROM 3-90\par \par Xray 3 views Left knee - Implants good position and well fixed

## 2023-06-16 NOTE — PROCEDURE
[Left] : of the left [Knee] : knee [Alcohol] : alcohol [Betadine] : betadine [Ethyl Chloride sprayed topically] : ethyl chloride sprayed topically [Sterile technique used] : sterile technique used [___ cc    1%] : Lidocaine ~Vcc of 1%  [Effusion] : effusion [] : Patient tolerated procedure well [Call if redness, pain or fever occur] : call if redness, pain or fever occur [Apply ice for 15min out of every hour for the next 12-24 hours as tolerated] : apply ice for 15 minutes out of every hour for the next 12-24 hours as tolerated [Patient was advised to rest the joint(s) for ____ days] : patient was advised to rest the joint(s) for [unfilled] days [Previous OTC use and PT nontherapeutic] : patient has tried OTC's including aspirin, Ibuprofen, Aleve, etc or prescription NSAIDS, and/or exercises at home and/or physical therapy without satisfactory response [Patient had decreased mobility in the joint] : patient had decreased mobility in the joint [Risks, benefits, alternatives discussed / Verbal consent obtained] : the risks benefits, and alternatives have been discussed, and verbal consent was obtained [Prior failure or difficult injection] : prior failure or difficult injection [All ultrasound images have been permanently captured and stored accordingly in our picture archiving and communication system] : All ultrasound images have been permanently captured and stored accordingly in our picture archiving and communication system [Visualization of the needle and placement of injection was performed without complication] : visualization of the needle and placement of injection was performed without complication [Pain] : pain [Inflammation] : inflammation [Effusion or other fluid collection] : effusion or other fluid collection [Cell count/dif] : cell count/dif [Culture] : culture

## 2023-06-16 NOTE — HISTORY OF PRESENT ILLNESS
[4] : 4 [Shooting] : shooting [Stabbing] : stabbing [Throbbing] : throbbing [] : Post Surgical Visit: yes [de-identified] : 6/16/23: 6 weeks s/p L TKA. He continues to improve with pain but notes stiffness. States he gets to flexion of 98 in PT. \par \par Previous doc:\par 76 y/o M with hx of advanced knee OA that has been treated by Dr. Reddy. Patient has had knee pain for multiple years that has gotten progressively worse. Has had multiple series of HA injections, with the last series providing little benefit. Has not done PT, he has been doing HEP. OTC medication as needed. \par 11/29/22: Had inj last month min pain today but scheduled for TKA 5/3/23.\par 4/21/23: Cont pain, planning for left TKA in May.\par 5/16/23: 2 weeks s/p left TKA, a lot of pain but rarely taking oxy.  No fevers/chills.\par  [FreeTextEntry1] : Left Knee [FreeTextEntry5] : Cristóbal Morocho 78M, here for 2 visit after surgery. Knee is doing better than the last visit. Physical therapy is helping with pain.  [de-identified] : tylenol, oxycodone [de-identified] : 5/3/23 [de-identified] : Lt Knee TKA

## 2023-06-22 ENCOUNTER — OUTPATIENT (OUTPATIENT)
Dept: OUTPATIENT SERVICES | Facility: HOSPITAL | Age: 78
LOS: 1 days | Discharge: HOME | End: 2023-06-22

## 2023-06-22 VITALS
WEIGHT: 220.02 LBS | HEART RATE: 55 BPM | SYSTOLIC BLOOD PRESSURE: 130 MMHG | OXYGEN SATURATION: 99 % | RESPIRATION RATE: 18 BRPM | TEMPERATURE: 98 F | HEIGHT: 78 IN | DIASTOLIC BLOOD PRESSURE: 70 MMHG

## 2023-06-22 DIAGNOSIS — Z01.818 ENCOUNTER FOR OTHER PREPROCEDURAL EXAMINATION: ICD-10-CM

## 2023-06-22 DIAGNOSIS — Z96.659 PRESENCE OF UNSPECIFIED ARTIFICIAL KNEE JOINT: Chronic | ICD-10-CM

## 2023-06-22 DIAGNOSIS — E78.5 HYPERLIPIDEMIA, UNSPECIFIED: ICD-10-CM

## 2023-06-22 DIAGNOSIS — S76.111A STRAIN OF RIGHT QUADRICEPS MUSCLE, FASCIA AND TENDON, INITIAL ENCOUNTER: Chronic | ICD-10-CM

## 2023-06-22 DIAGNOSIS — Z96.652 PRESENCE OF LEFT ARTIFICIAL KNEE JOINT: ICD-10-CM

## 2023-06-22 DIAGNOSIS — M25.562 PAIN IN LEFT KNEE: ICD-10-CM

## 2023-06-22 LAB
ANION GAP SERPL CALC-SCNC: 1 MMOL/L — LOW (ref 5–17)
BASOPHILS # BLD AUTO: 0.02 K/UL — SIGNIFICANT CHANGE UP (ref 0–0.2)
BASOPHILS NFR BLD AUTO: 0.3 % — SIGNIFICANT CHANGE UP (ref 0–2)
BUN SERPL-MCNC: 18 MG/DL — SIGNIFICANT CHANGE UP (ref 7–23)
CALCIUM SERPL-MCNC: 9.7 MG/DL — SIGNIFICANT CHANGE UP (ref 8.5–10.1)
CHLORIDE SERPL-SCNC: 109 MMOL/L — HIGH (ref 96–108)
CO2 SERPL-SCNC: 26 MMOL/L — SIGNIFICANT CHANGE UP (ref 22–31)
CREAT SERPL-MCNC: 1.08 MG/DL — SIGNIFICANT CHANGE UP (ref 0.5–1.3)
EGFR: 71 ML/MIN/1.73M2 — SIGNIFICANT CHANGE UP
EOSINOPHIL # BLD AUTO: 0.17 K/UL — SIGNIFICANT CHANGE UP (ref 0–0.5)
EOSINOPHIL NFR BLD AUTO: 2.4 % — SIGNIFICANT CHANGE UP (ref 0–6)
GLUCOSE SERPL-MCNC: 97 MG/DL — SIGNIFICANT CHANGE UP (ref 70–99)
HCT VFR BLD CALC: 40.8 % — SIGNIFICANT CHANGE UP (ref 39–50)
HGB BLD-MCNC: 13.3 G/DL — SIGNIFICANT CHANGE UP (ref 13–17)
IMM GRANULOCYTES NFR BLD AUTO: 0.4 % — SIGNIFICANT CHANGE UP (ref 0–0.9)
LYMPHOCYTES # BLD AUTO: 1.39 K/UL — SIGNIFICANT CHANGE UP (ref 1–3.3)
LYMPHOCYTES # BLD AUTO: 20 % — SIGNIFICANT CHANGE UP (ref 13–44)
MCHC RBC-ENTMCNC: 30.2 PG — SIGNIFICANT CHANGE UP (ref 27–34)
MCHC RBC-ENTMCNC: 32.6 G/DL — SIGNIFICANT CHANGE UP (ref 32–36)
MCV RBC AUTO: 92.7 FL — SIGNIFICANT CHANGE UP (ref 80–100)
MONOCYTES # BLD AUTO: 0.84 K/UL — SIGNIFICANT CHANGE UP (ref 0–0.9)
MONOCYTES NFR BLD AUTO: 12.1 % — SIGNIFICANT CHANGE UP (ref 2–14)
NEUTROPHILS # BLD AUTO: 4.51 K/UL — SIGNIFICANT CHANGE UP (ref 1.8–7.4)
NEUTROPHILS NFR BLD AUTO: 64.8 % — SIGNIFICANT CHANGE UP (ref 43–77)
NRBC # BLD: 0 /100 WBCS — SIGNIFICANT CHANGE UP (ref 0–0)
PLATELET # BLD AUTO: 298 K/UL — SIGNIFICANT CHANGE UP (ref 150–400)
POTASSIUM SERPL-MCNC: 4 MMOL/L — SIGNIFICANT CHANGE UP (ref 3.5–5.3)
POTASSIUM SERPL-SCNC: 4 MMOL/L — SIGNIFICANT CHANGE UP (ref 3.5–5.3)
RBC # BLD: 4.4 M/UL — SIGNIFICANT CHANGE UP (ref 4.2–5.8)
RBC # FLD: 13.5 % — SIGNIFICANT CHANGE UP (ref 10.3–14.5)
SODIUM SERPL-SCNC: 136 MMOL/L — SIGNIFICANT CHANGE UP (ref 135–145)
WBC # BLD: 6.96 K/UL — SIGNIFICANT CHANGE UP (ref 3.8–10.5)
WBC # FLD AUTO: 6.96 K/UL — SIGNIFICANT CHANGE UP (ref 3.8–10.5)

## 2023-06-22 NOTE — H&P PST ADULT - NSICDXPASTSURGICALHX_GEN_ALL_CORE_FT
PAST SURGICAL HISTORY:  S/P total knee arthroplasty     Traumatic rupture of right quadriceps tendon

## 2023-06-22 NOTE — H&P PST ADULT - HISTORY OF PRESENT ILLNESS
77 y/o M PMH HLD s/p  left knee arthroplasty  in may now c/o left knee not able to bend or extend as much secondary to  scar tissue - scheduled for left knee manipulation under anesthesia

## 2023-06-22 NOTE — H&P PST ADULT - ASSESSMENT
left knee pain   CAPRINI SCORE    AGE RELATED RISK FACTORS                                                       MOBILITY RELATED FACTORS  [ ] Age 41-60 years                                            (1 Point)                  [ ] Bed rest                                                        (1 Point)  [ ] Age: 61-74 years                                           (2 Points)                [ ] Plaster cast                                                   (2 Points)  [x ] Age= 75 years                                              (3 Points)                 [ ] Bed bound for more than 72 hours                   (2 Points)    DISEASE RELATED RISK FACTORS                                               GENDER SPECIFIC FACTORS  [ ] Edema in the lower extremities                       (1 Point)                  [ ] Pregnancy                                                     (1 Point)  [ ] Varicose veins                                               (1 Point)                  [ ] Post-partum < 6 weeks                                   (1 Point)             [x ] BMI > 25 Kg/m2                                            (1 Point)                  [ ] Hormonal therapy  or oral contraception            (1 Point)                 [ ] Sepsis (in the previous month)                        (1 Point)                  [ ] History of pregnancy complications  [ ] Pneumonia or serious lung disease                                               [ ] Unexplained or recurrent                       (1 Point)           (in the previous month)                               (1 Point)  [ ] Abnormal pulmonary function test                     (1 Point)                 SURGERY RELATED RISK FACTORS  [ ] Acute myocardial infarction                              (1 Point)                 [ ]  Section                                            (1 Point)  [ ] Congestive heart failure (in the previous month)  (1 Point)                 [ ] Minor surgery                                                 (1 Point)   [ ] Inflammatory bowel disease                             (1 Point)                 [x ] Arthroscopic surgery                                        (2 Points)  [ ] Central venous access                                    (2 Points)                [ ] General surgery lasting more than 45 minutes   (2 Points)       [ ] Stroke (in the previous month)                          (5 Points)               [ ] Elective arthroplasty                                        (5 Points)                                                                                                                                               HEMATOLOGY RELATED FACTORS                                                 TRAUMA RELATED RISK FACTORS  [ ] Prior episodes of VTE                                     (3 Points)                 [ ] Fracture of the hip, pelvis, or leg                       (5 Points)  [ ] Positive family history for VTE                         (3 Points)                 [ ] Acute spinal cord injury (in the previous month)  (5 Points)  [ ] Prothrombin 31189 A                                      (3 Points)                 [ ] Paralysis  (less than 1 month)                          (5 Points)  [ ] Factor V Leiden                                             (3 Points)                 [ ] Multiple Trauma within 1 month                         (5 Points)  [ ] Lupus anticoagulants                                     (3 Points)                                                           [ ] Anticardiolipin antibodies                                (3 Points)                                                       [ ] High homocysteine in the blood                      (3 Points)                                             [ ] Other congenital or acquired thrombophilia       (3 Points)                                                [ ] Heparin induced thrombocytopenia                  (3 Points)                                          Total Score [    6      ]  Caprini Score 0-2: Low risk, No VTE Prophylaxis required for most patient's, encourage ambulation  Caprini Score 3-6: At Risk, Pharmacologic VTE prophylaxis is indicated for most patients ( in the absence of a contraindication)  Caprini Score Greater than or = 7: High Risk , pharmacologic VTE is indicated for most patients ( in the absence of a contraindication)    Caprini score indicates that the patient is high risk for VTE event ( score 6 or greater). Surgical patient's in this group will benefit from both pharmacologic prophylaxis and intermittent compression devices . Surgical team will determine the balance between VTE  risk and bleeding risk and other clinical considerations

## 2023-06-23 RX ORDER — SODIUM CHLORIDE 9 MG/ML
3 INJECTION INTRAMUSCULAR; INTRAVENOUS; SUBCUTANEOUS EVERY 8 HOURS
Refills: 0 | Status: DISCONTINUED | OUTPATIENT
Start: 2023-06-28 | End: 2023-07-12

## 2023-06-27 ENCOUNTER — TRANSCRIPTION ENCOUNTER (OUTPATIENT)
Age: 78
End: 2023-06-27

## 2023-06-28 ENCOUNTER — APPOINTMENT (OUTPATIENT)
Dept: ORTHOPEDIC SURGERY | Facility: HOSPITAL | Age: 78
End: 2023-06-28
Payer: MEDICARE

## 2023-06-28 ENCOUNTER — OUTPATIENT (OUTPATIENT)
Dept: OUTPATIENT SERVICES | Facility: HOSPITAL | Age: 78
LOS: 1 days | Discharge: HOME | End: 2023-06-28

## 2023-06-28 ENCOUNTER — TRANSCRIPTION ENCOUNTER (OUTPATIENT)
Age: 78
End: 2023-06-28

## 2023-06-28 VITALS
TEMPERATURE: 98 F | RESPIRATION RATE: 15 BRPM | SYSTOLIC BLOOD PRESSURE: 160 MMHG | DIASTOLIC BLOOD PRESSURE: 90 MMHG | HEART RATE: 62 BPM | OXYGEN SATURATION: 99 %

## 2023-06-28 VITALS
OXYGEN SATURATION: 98 % | TEMPERATURE: 98 F | HEIGHT: 73 IN | SYSTOLIC BLOOD PRESSURE: 158 MMHG | DIASTOLIC BLOOD PRESSURE: 76 MMHG | RESPIRATION RATE: 14 BRPM | WEIGHT: 220.02 LBS | HEART RATE: 56 BPM

## 2023-06-28 DIAGNOSIS — S76.111A STRAIN OF RIGHT QUADRICEPS MUSCLE, FASCIA AND TENDON, INITIAL ENCOUNTER: Chronic | ICD-10-CM

## 2023-06-28 DIAGNOSIS — Z96.659 PRESENCE OF UNSPECIFIED ARTIFICIAL KNEE JOINT: Chronic | ICD-10-CM

## 2023-06-28 PROCEDURE — 27570 FIXATION OF KNEE JOINT: CPT | Mod: 78,LT

## 2023-06-28 RX ORDER — ACETAMINOPHEN 500 MG
1000 TABLET ORAL ONCE
Refills: 0 | Status: COMPLETED | OUTPATIENT
Start: 2023-06-28 | End: 2023-06-28

## 2023-06-28 RX ORDER — HYDROMORPHONE HYDROCHLORIDE 2 MG/ML
0.5 INJECTION INTRAMUSCULAR; INTRAVENOUS; SUBCUTANEOUS
Refills: 0 | Status: DISCONTINUED | OUTPATIENT
Start: 2023-06-28 | End: 2023-06-28

## 2023-06-28 RX ORDER — SODIUM CHLORIDE 9 MG/ML
1000 INJECTION, SOLUTION INTRAVENOUS
Refills: 0 | Status: DISCONTINUED | OUTPATIENT
Start: 2023-06-28 | End: 2023-06-28

## 2023-06-28 RX ORDER — ONDANSETRON 8 MG/1
4 TABLET, FILM COATED ORAL ONCE
Refills: 0 | Status: DISCONTINUED | OUTPATIENT
Start: 2023-06-28 | End: 2023-06-28

## 2023-06-28 RX ORDER — ROSUVASTATIN CALCIUM 5 MG/1
1 TABLET ORAL
Qty: 0 | Refills: 0 | DISCHARGE

## 2023-06-28 RX ORDER — HYDROMORPHONE HYDROCHLORIDE 2 MG/ML
1 INJECTION INTRAMUSCULAR; INTRAVENOUS; SUBCUTANEOUS
Refills: 0 | Status: DISCONTINUED | OUTPATIENT
Start: 2023-06-28 | End: 2023-06-28

## 2023-06-28 RX ADMIN — Medication 1000 MILLIGRAM(S): at 08:18

## 2023-06-28 RX ADMIN — SODIUM CHLORIDE 100 MILLILITER(S): 9 INJECTION, SOLUTION INTRAVENOUS at 08:04

## 2023-06-28 RX ADMIN — Medication 400 MILLIGRAM(S): at 08:03

## 2023-06-28 NOTE — ASU PATIENT PROFILE, ADULT - FALL HARM RISK - HARM RISK INTERVENTIONS

## 2023-07-03 DIAGNOSIS — Z96.652 PRESENCE OF LEFT ARTIFICIAL KNEE JOINT: ICD-10-CM

## 2023-07-03 DIAGNOSIS — E78.5 HYPERLIPIDEMIA, UNSPECIFIED: ICD-10-CM

## 2023-07-03 DIAGNOSIS — M24.662 ANKYLOSIS, LEFT KNEE: ICD-10-CM

## 2023-07-03 DIAGNOSIS — G47.33 OBSTRUCTIVE SLEEP APNEA (ADULT) (PEDIATRIC): ICD-10-CM

## 2023-07-21 ENCOUNTER — APPOINTMENT (OUTPATIENT)
Dept: ORTHOPEDIC SURGERY | Facility: CLINIC | Age: 78
End: 2023-07-21
Payer: MEDICARE

## 2023-07-21 PROCEDURE — 99024 POSTOP FOLLOW-UP VISIT: CPT

## 2023-07-21 NOTE — PROCEDURE
[Left] : of the left [Knee] : knee [Alcohol] : alcohol [Betadine] : betadine [Ethyl Chloride sprayed topically] : ethyl chloride sprayed topically [Sterile technique used] : sterile technique used [___ cc    1%] : Lidocaine ~Vcc of 1%  [Effusion] : effusion [Cell count/dif] : cell count/dif [Culture] : culture [] : Patient tolerated procedure well [Call if redness, pain or fever occur] : call if redness, pain or fever occur [Apply ice for 15min out of every hour for the next 12-24 hours as tolerated] : apply ice for 15 minutes out of every hour for the next 12-24 hours as tolerated [Patient was advised to rest the joint(s) for ____ days] : patient was advised to rest the joint(s) for [unfilled] days [Previous OTC use and PT nontherapeutic] : patient has tried OTC's including aspirin, Ibuprofen, Aleve, etc or prescription NSAIDS, and/or exercises at home and/or physical therapy without satisfactory response [Patient had decreased mobility in the joint] : patient had decreased mobility in the joint [Risks, benefits, alternatives discussed / Verbal consent obtained] : the risks benefits, and alternatives have been discussed, and verbal consent was obtained [Prior failure or difficult injection] : prior failure or difficult injection [All ultrasound images have been permanently captured and stored accordingly in our picture archiving and communication system] : All ultrasound images have been permanently captured and stored accordingly in our picture archiving and communication system [Visualization of the needle and placement of injection was performed without complication] : visualization of the needle and placement of injection was performed without complication [Pain] : pain [Inflammation] : inflammation [Effusion or other fluid collection] : effusion or other fluid collection

## 2023-07-21 NOTE — DISCUSSION/SUMMARY
[de-identified] : The patient was advised of the diagnosis.  The natural history of the pathology was explained in full to the patient in layman's terms. All questions were answered.  The risks and benefits of surgical and non-surgical treatment alternatives were explained in full to the patient.\par

## 2023-07-21 NOTE — ASSESSMENT
[FreeTextEntry1] : Previous doc:\par 9/12/22: Advanced L knee OA - He has failed forms of conservative treatment and is indicated for TKA. Risks and benefits discussed, all questions answered and long discussion with his wife present. They do live in [place] during the winter and will plan for TKA around the Spring time. They will call and set up televisit in the new year to discuss possible dates in mid-late April.  \par 11/29/22: Planning for TKA in May - discussed risks and benefits.  Need CT left knee eval bone loss and he will do this before he leaves for [place] in Feb.\par 4/21/23: Cont pain - will proceed with TKA as scheduled.  No significnat medical hx.\par 5/16/23: 2 weeks postop having a lot fof pain but not taking meds - encouraged him to take meds with PT to help with increasing ROM.  Reeval in 4 weeks.\par 6/16/23: 6wks postop L TKA 5/3/23. Large effusion. ROM is 3-90 today; he was 7-115 preop. Aspiration today, tolerated well - 5cc clear yellow fluid. Continue with PT/HEP. Discussed ESTEPHANIA- he is well informed and would like to proceed. \par \par 7/21/23: ESTEPHANIA helped but still some stiffness.  Cont PT/HEP.  ROM progressing.

## 2023-07-21 NOTE — PHYSICAL EXAM
[Left] : left knee [AP] : anteroposterior [Lateral] : lateral [[place]] : skyline [Components well fixed, in good position] : Components well fixed, in good position [de-identified] : LEFT KNEE\par Incision is clean and dry with no drainage - healing well \par Sensation intact\par Motor 5/5 \par +1 edema LE\par ROM 3-100\par

## 2023-07-21 NOTE — HISTORY OF PRESENT ILLNESS
[4] : 4 [Shooting] : shooting [Stabbing] : stabbing [Throbbing] : throbbing [] : Post Surgical Visit: yes [de-identified] : 7/21/23: ESTEPHANIA helped, still feels tight.\par \par Previous doc:\par 76 y/o M with hx of advanced knee OA that has been treated by Dr. Reddy. Patient has had knee pain for multiple years that has gotten progressively worse. Has had multiple series of HA injections, with the last series providing little benefit. Has not done PT, he has been doing HEP. OTC medication as needed. \par 11/29/22: Had inj last month min pain today but scheduled for TKA 5/3/23.\par 4/21/23: Cont pain, planning for left TKA in May.\par 5/16/23: 2 weeks s/p left TKA, a lot of pain but rarely taking oxy.  No fevers/chills.\par 6/16/23: 6 weeks s/p L TKA. He continues to improve with pain but notes stiffness. States he gets to flexion of 98 in PT.  [FreeTextEntry1] : Left Knee [FreeTextEntry5] : Cristóbal Morocho 78M, here for 3 visit after surgery. Knee is doing better than the last visit. Physical therapy is helping with pain. Knee is swollen. still having  pain.  [de-identified] : tylenol, oxycodone [de-identified] : 5/3/23 [de-identified] : Lt Knee TKA

## 2023-07-24 LAB — JOINT CULTURE: NORMAL

## 2023-09-15 ENCOUNTER — APPOINTMENT (OUTPATIENT)
Dept: ORTHOPEDIC SURGERY | Facility: CLINIC | Age: 78
End: 2023-09-15
Payer: MEDICARE

## 2023-09-15 VITALS — HEIGHT: 73 IN | WEIGHT: 216 LBS | BODY MASS INDEX: 28.63 KG/M2

## 2023-09-15 DIAGNOSIS — M17.12 UNILATERAL PRIMARY OSTEOARTHRITIS, LEFT KNEE: ICD-10-CM

## 2023-09-15 PROCEDURE — 99213 OFFICE O/P EST LOW 20 MIN: CPT

## 2023-09-21 ASSESSMENT — KOOS JR
STRAIGHTENING KNEE FULLY: MILD
IMPORTED LATERALITY: LEFT
STANDING UPRIGHT: MODERATE
RISING FROM SITTING: MODERATE
TWISING OR PIVOTING ON KNEE: MODERATE
IMPORTED FORM: YES
KOOS JR RAW SCORE: 13
HOW SEVERE IS YOUR KNEE STIFFNESS AFTER FIRST WAKING IN MORNING: MODERATE
BENDING TO THE FLOOR TO PICK UP OBJECT: MODERATE
IMPORTED KOOS JR SCORE: 13.0
GOING UP OR DOWN STAIRS: MODERATE

## 2023-12-15 ENCOUNTER — APPOINTMENT (OUTPATIENT)
Dept: ORTHOPEDIC SURGERY | Facility: CLINIC | Age: 78
End: 2023-12-15
Payer: MEDICARE

## 2023-12-15 VITALS — BODY MASS INDEX: 28.63 KG/M2 | WEIGHT: 216 LBS | HEIGHT: 73 IN

## 2023-12-15 DIAGNOSIS — Z96.652 PRESENCE OF LEFT ARTIFICIAL KNEE JOINT: ICD-10-CM

## 2023-12-15 PROCEDURE — 99213 OFFICE O/P EST LOW 20 MIN: CPT

## 2023-12-15 NOTE — ASSESSMENT
[FreeTextEntry1] : 9/12/22: Advanced L knee OA - He has failed forms of conservative treatment and is indicated for TKA. Risks and benefits discussed, all questions answered and long discussion with his wife present. They do live in [place] during the winter and will plan for TKA around the Spring time. They will call and set up televisit in the new year to discuss possible dates in mid-late April. 11/29/22: Planning for TKA in May - discussed risks and benefits. Need CT left knee eval bone loss and he will do this before he leaves for [place] in Feb. 4/21/23: Cont pain - will proceed with TKA as scheduled. No significnat medical hx. 5/16/23: 2 weeks postop having a lot fof pain but not taking meds - encouraged him to take meds with PT to help with increasing ROM. Reeval in 4 weeks. 6/16/23: 6wks postop L TKA 5/3/23. Large effusion. ROM is 3-90 today; he was 7-115 preop. Aspiration today, tolerated well - 5cc clear yellow fluid. Continue with PT/HEP. Discussed ESTEPHANIA- he is well informed and would like to proceed. 7/21/23: ESTEPHANIA helped but still some stiffness. Cont PT/HEP. ROM progressing. 9/15/23: Showing improvement, cont PT/HEP. Gets to 110 today.  12/15/23:  Pt has minimal pain and effusion that will likely subside over time. - he is happy with his outcome and planning going to [place] soon

## 2023-12-15 NOTE — DISCUSSION/SUMMARY
[de-identified] : The patient was advised of the diagnosis.  The natural history of the pathology was explained in full to the patient in layman's terms. All questions were answered.  The risks and benefits of surgical and non-surgical treatment alternatives were explained in full to the patient.  Entered by Jaye Yun acting as a scribe.

## 2023-12-15 NOTE — HISTORY OF PRESENT ILLNESS
[0] : 0 [Dull/Aching] : dull/aching [Throbbing] : throbbing [de-identified] : 12/15/23:  Pt has been doing HEP and is improving and no pain with walking.    Previous doc: 76 y/o M with hx of advanced knee OA that has been treated by Dr. Reddy. Patient has had knee pain for multiple years that has gotten progressively worse. Has had multiple series of HA injections, with the last series providing little benefit. Has not done PT, he has been doing HEP. OTC medication as needed.  11/29/22: Had inj last month min pain today but scheduled for TKA 5/3/23. 4/21/23: Cont pain, planning for left TKA in May. 5/16/23: 2 weeks s/p left TKA, a lot of pain but rarely taking oxy.  No fevers/chills. 6/16/23: 6 weeks s/p L TKA. He continues to improve with pain but notes stiffness. States he gets to flexion of 98 in PT.  7/21/23: ESTEPHANIA helped, still feels tight. 9/15/23: Improving with PT. [] : no [FreeTextEntry1] : LT KNEE  [FreeTextEntry5] : patient is here for follow up of left knee.  he is 7 months s/p ESTEPHANIA [de-identified] : 06/28/23 [de-identified] : RACHEL L KNEE ESTEPHANIA

## 2024-04-05 NOTE — ASU DISCHARGE PLAN (ADULT/PEDIATRIC) - A. DRIVE A CAR, OPERATE POWER TOOLS OR MACHINERY
Statement Selected
Mildly to Moderately Impaired: difficulty hearing in some environments or speaker may need to increase volume or speak distinctly

## 2024-05-03 ENCOUNTER — APPOINTMENT (OUTPATIENT)
Dept: ORTHOPEDIC SURGERY | Facility: CLINIC | Age: 79
End: 2024-05-03

## 2024-07-25 NOTE — OCCUPATIONAL THERAPY INITIAL EVALUATION ADULT - RANGE OF MOTION EXAMINATION, UPPER EXTREMITY
2024  [facility] (supporting You)         24  7:50 PM  I need a new script for the [facility] the pharmacy can't fill it bc it's    
Refill entered, please sign off  
bilateral UE Passive ROM was WNL (within normal limits)

## 2025-05-20 NOTE — H&P PST ADULT - NSICDXPASTMEDICALHX_GEN_ALL_CORE_FT
PAST MEDICAL HISTORY:  HLD (hyperlipidemia)      I will START or STAY ON the medications listed below when I get home from the hospital:  None

## (undated) DEVICE — DRSG COMBINE 5X9"

## (undated) DEVICE — Device

## (undated) DEVICE — APPLICATOR SKIN PREP CHG 3CC

## (undated) DEVICE — NDL HYPO SAFE 18G X 1.5" (PINK)

## (undated) DEVICE — SYR LUER LOK 10CC

## (undated) DEVICE — GLV 8.5 PROTEXIS (WHITE)

## (undated) DEVICE — DRSG BANDAID 3.75X3"

## (undated) DEVICE — DRSG ACE BANDAGE 6"